# Patient Record
Sex: MALE | Race: OTHER | NOT HISPANIC OR LATINO | URBAN - METROPOLITAN AREA
[De-identification: names, ages, dates, MRNs, and addresses within clinical notes are randomized per-mention and may not be internally consistent; named-entity substitution may affect disease eponyms.]

---

## 2024-01-08 ENCOUNTER — LAB (OUTPATIENT)
Dept: LAB | Facility: LAB | Age: 34
End: 2024-01-08
Payer: COMMERCIAL

## 2024-01-08 DIAGNOSIS — E78.5 HYPERLIPIDEMIA, UNSPECIFIED: ICD-10-CM

## 2024-01-08 DIAGNOSIS — I10 ESSENTIAL (PRIMARY) HYPERTENSION: ICD-10-CM

## 2024-01-08 DIAGNOSIS — G47.33 OBSTRUCTIVE SLEEP APNEA (ADULT) (PEDIATRIC): ICD-10-CM

## 2024-01-08 DIAGNOSIS — E66.01 MORBID (SEVERE) OBESITY DUE TO EXCESS CALORIES (MULTI): ICD-10-CM

## 2024-01-08 DIAGNOSIS — E11.65 TYPE 2 DIABETES MELLITUS WITH HYPERGLYCEMIA (MULTI): Primary | ICD-10-CM

## 2024-01-08 LAB
ALBUMIN SERPL BCP-MCNC: 4.5 G/DL (ref 3.4–5)
ALP SERPL-CCNC: 22 U/L (ref 33–120)
ALT SERPL W P-5'-P-CCNC: 74 U/L (ref 10–52)
ANION GAP SERPL CALC-SCNC: 13 MMOL/L (ref 10–20)
AST SERPL W P-5'-P-CCNC: 44 U/L (ref 9–39)
BILIRUB SERPL-MCNC: 0.4 MG/DL (ref 0–1.2)
BUN SERPL-MCNC: 15 MG/DL (ref 6–23)
CALCIUM SERPL-MCNC: 9.2 MG/DL (ref 8.6–10.3)
CHLORIDE SERPL-SCNC: 101 MMOL/L (ref 98–107)
CHOLEST SERPL-MCNC: 129 MG/DL (ref 0–199)
CHOLESTEROL/HDL RATIO: 2.4
CO2 SERPL-SCNC: 28 MMOL/L (ref 21–32)
CREAT SERPL-MCNC: 0.78 MG/DL (ref 0.5–1.3)
CREAT UR-MCNC: 100 MG/DL (ref 20–370)
EGFRCR SERPLBLD CKD-EPI 2021: >90 ML/MIN/1.73M*2
ERYTHROCYTE [DISTWIDTH] IN BLOOD BY AUTOMATED COUNT: 13.5 % (ref 11.5–14.5)
EST. AVERAGE GLUCOSE BLD GHB EST-MCNC: 131 MG/DL
GLUCOSE SERPL-MCNC: 118 MG/DL (ref 74–99)
HBA1C MFR BLD: 6.2 %
HCT VFR BLD AUTO: 49.7 % (ref 41–52)
HDLC SERPL-MCNC: 52.9 MG/DL
HGB BLD-MCNC: 16.1 G/DL (ref 13.5–17.5)
LDLC SERPL CALC-MCNC: 59 MG/DL
MCH RBC QN AUTO: 29 PG (ref 26–34)
MCHC RBC AUTO-ENTMCNC: 32.4 G/DL (ref 32–36)
MCV RBC AUTO: 89 FL (ref 80–100)
MICROALBUMIN UR-MCNC: 9.7 MG/L
MICROALBUMIN/CREAT UR: 9.7 UG/MG CREAT
NON HDL CHOLESTEROL: 76 MG/DL (ref 0–149)
NRBC BLD-RTO: 0 /100 WBCS (ref 0–0)
PLATELET # BLD AUTO: 163 X10*3/UL (ref 150–450)
POTASSIUM SERPL-SCNC: 4.1 MMOL/L (ref 3.5–5.3)
PROT SERPL-MCNC: 6.8 G/DL (ref 6.4–8.2)
RBC # BLD AUTO: 5.56 X10*6/UL (ref 4.5–5.9)
SODIUM SERPL-SCNC: 138 MMOL/L (ref 136–145)
TRIGL SERPL-MCNC: 84 MG/DL (ref 0–149)
VLDL: 17 MG/DL (ref 0–40)
WBC # BLD AUTO: 6.8 X10*3/UL (ref 4.4–11.3)

## 2024-01-08 PROCEDURE — 83036 HEMOGLOBIN GLYCOSYLATED A1C: CPT

## 2024-01-08 PROCEDURE — 80061 LIPID PANEL: CPT

## 2024-01-08 PROCEDURE — 36415 COLL VENOUS BLD VENIPUNCTURE: CPT

## 2024-01-08 PROCEDURE — 82570 ASSAY OF URINE CREATININE: CPT

## 2024-01-08 PROCEDURE — 80053 COMPREHEN METABOLIC PANEL: CPT

## 2024-01-08 PROCEDURE — 85027 COMPLETE CBC AUTOMATED: CPT

## 2024-01-08 PROCEDURE — 82043 UR ALBUMIN QUANTITATIVE: CPT

## 2024-02-01 ENCOUNTER — OFFICE VISIT (OUTPATIENT)
Dept: PRIMARY CARE | Facility: CLINIC | Age: 34
End: 2024-02-01
Payer: COMMERCIAL

## 2024-02-01 VITALS
SYSTOLIC BLOOD PRESSURE: 120 MMHG | TEMPERATURE: 97.3 F | DIASTOLIC BLOOD PRESSURE: 76 MMHG | WEIGHT: 315 LBS | HEIGHT: 70 IN | RESPIRATION RATE: 16 BRPM | OXYGEN SATURATION: 94 % | BODY MASS INDEX: 45.1 KG/M2 | HEART RATE: 76 BPM

## 2024-02-01 DIAGNOSIS — R79.89 ELEVATED TSH: ICD-10-CM

## 2024-02-01 DIAGNOSIS — I10 BENIGN ESSENTIAL HYPERTENSION: ICD-10-CM

## 2024-02-01 DIAGNOSIS — E11.9 TYPE 2 DIABETES MELLITUS WITHOUT COMPLICATION, WITHOUT LONG-TERM CURRENT USE OF INSULIN (MULTI): Primary | ICD-10-CM

## 2024-02-01 PROBLEM — E78.5 HYPERLIPIDEMIA: Status: ACTIVE | Noted: 2018-01-09

## 2024-02-01 PROBLEM — Z86.16 HISTORY OF COVID-19: Status: ACTIVE | Noted: 2022-12-28

## 2024-02-01 PROBLEM — F17.200 NICOTINE USE DISORDER: Status: ACTIVE | Noted: 2022-02-07

## 2024-02-01 PROBLEM — F50.9 DISORDERED EATING: Status: ACTIVE | Noted: 2018-09-24

## 2024-02-01 PROBLEM — K76.0 NAFLD (NONALCOHOLIC FATTY LIVER DISEASE): Status: ACTIVE | Noted: 2018-03-13

## 2024-02-01 PROBLEM — R74.8 ELEVATED LIVER ENZYMES: Status: ACTIVE | Noted: 2022-06-08

## 2024-02-01 PROBLEM — J96.02 ACUTE RESPIRATORY FAILURE WITH HYPOXIA AND HYPERCAPNIA (MULTI): Status: RESOLVED | Noted: 2022-02-07 | Resolved: 2024-02-01

## 2024-02-01 PROBLEM — J96.92 HYPERCAPNIC RESPIRATORY FAILURE (MULTI): Status: ACTIVE | Noted: 2024-02-01

## 2024-02-01 PROBLEM — J96.01 ACUTE RESPIRATORY FAILURE WITH HYPOXIA AND HYPERCAPNIA (MULTI): Status: RESOLVED | Noted: 2022-02-07 | Resolved: 2024-02-01

## 2024-02-01 PROBLEM — Z99.11: Status: RESOLVED | Noted: 2022-12-28 | Resolved: 2024-02-01

## 2024-02-01 PROCEDURE — 3061F NEG MICROALBUMINURIA REV: CPT | Performed by: STUDENT IN AN ORGANIZED HEALTH CARE EDUCATION/TRAINING PROGRAM

## 2024-02-01 PROCEDURE — 3008F BODY MASS INDEX DOCD: CPT | Performed by: STUDENT IN AN ORGANIZED HEALTH CARE EDUCATION/TRAINING PROGRAM

## 2024-02-01 PROCEDURE — 99385 PREV VISIT NEW AGE 18-39: CPT | Performed by: STUDENT IN AN ORGANIZED HEALTH CARE EDUCATION/TRAINING PROGRAM

## 2024-02-01 PROCEDURE — 3044F HG A1C LEVEL LT 7.0%: CPT | Performed by: STUDENT IN AN ORGANIZED HEALTH CARE EDUCATION/TRAINING PROGRAM

## 2024-02-01 PROCEDURE — 4010F ACE/ARB THERAPY RXD/TAKEN: CPT | Performed by: STUDENT IN AN ORGANIZED HEALTH CARE EDUCATION/TRAINING PROGRAM

## 2024-02-01 PROCEDURE — 3048F LDL-C <100 MG/DL: CPT | Performed by: STUDENT IN AN ORGANIZED HEALTH CARE EDUCATION/TRAINING PROGRAM

## 2024-02-01 PROCEDURE — 3074F SYST BP LT 130 MM HG: CPT | Performed by: STUDENT IN AN ORGANIZED HEALTH CARE EDUCATION/TRAINING PROGRAM

## 2024-02-01 PROCEDURE — 3078F DIAST BP <80 MM HG: CPT | Performed by: STUDENT IN AN ORGANIZED HEALTH CARE EDUCATION/TRAINING PROGRAM

## 2024-02-01 PROCEDURE — 99214 OFFICE O/P EST MOD 30 MIN: CPT | Performed by: STUDENT IN AN ORGANIZED HEALTH CARE EDUCATION/TRAINING PROGRAM

## 2024-02-01 RX ORDER — HYDROCHLOROTHIAZIDE 25 MG/1
25 TABLET ORAL DAILY
Qty: 90 TABLET | Refills: 3 | Status: SHIPPED | OUTPATIENT
Start: 2024-02-01 | End: 2024-05-07 | Stop reason: SDUPTHER

## 2024-02-01 RX ORDER — METRONIDAZOLE 7.5 MG/G
CREAM TOPICAL
COMMUNITY

## 2024-02-01 RX ORDER — IPRATROPIUM BROMIDE AND ALBUTEROL SULFATE 2.5; .5 MG/3ML; MG/3ML
3 SOLUTION RESPIRATORY (INHALATION) 4 TIMES DAILY
COMMUNITY
Start: 2022-02-10

## 2024-02-01 RX ORDER — LIRAGLUTIDE 6 MG/ML
0.6 INJECTION SUBCUTANEOUS DAILY
COMMUNITY
Start: 2023-12-06 | End: 2024-02-01 | Stop reason: ALTCHOICE

## 2024-02-01 RX ORDER — HYDROCHLOROTHIAZIDE 25 MG/1
25 TABLET ORAL DAILY
COMMUNITY
End: 2024-02-01 | Stop reason: SDUPTHER

## 2024-02-01 RX ORDER — ALLOPURINOL 300 MG/1
1 TABLET ORAL
COMMUNITY
Start: 2023-07-17 | End: 2024-05-07 | Stop reason: SDUPTHER

## 2024-02-01 RX ORDER — TIOTROPIUM BROMIDE 18 UG/1
18 CAPSULE ORAL; RESPIRATORY (INHALATION)
COMMUNITY

## 2024-02-01 RX ORDER — LISINOPRIL 20 MG/1
20 TABLET ORAL DAILY
Qty: 90 TABLET | Refills: 3 | Status: SHIPPED | OUTPATIENT
Start: 2024-02-01 | End: 2024-04-04 | Stop reason: SDUPTHER

## 2024-02-01 RX ORDER — METFORMIN HYDROCHLORIDE 1000 MG/1
500 TABLET ORAL
COMMUNITY
End: 2024-05-07 | Stop reason: SDUPTHER

## 2024-02-01 RX ORDER — KETOCONAZOLE 20 MG/ML
SHAMPOO, SUSPENSION TOPICAL
COMMUNITY
Start: 2024-01-15

## 2024-02-01 RX ORDER — UMECLIDINIUM BROMIDE AND VILANTEROL TRIFENATATE 62.5; 25 UG/1; UG/1
1 POWDER RESPIRATORY (INHALATION)
COMMUNITY
Start: 2023-03-02

## 2024-02-01 RX ORDER — MOMETASONE FUROATE 1 MG/ML
SOLUTION TOPICAL
COMMUNITY
Start: 2024-01-15

## 2024-02-01 RX ORDER — DULAGLUTIDE 0.75 MG/.5ML
0.75 INJECTION, SOLUTION SUBCUTANEOUS
Qty: 2 ML | Refills: 3 | Status: SHIPPED | OUTPATIENT
Start: 2024-02-01 | End: 2024-05-09 | Stop reason: SDUPTHER

## 2024-02-01 RX ORDER — MICONAZOLE NITRATE 2 %
2 CREAM (GRAM) TOPICAL EVERY 2 HOUR PRN
COMMUNITY
Start: 2024-01-08

## 2024-02-01 RX ORDER — SIMVASTATIN 20 MG/1
20 TABLET, FILM COATED ORAL NIGHTLY
COMMUNITY
Start: 2022-01-07 | End: 2024-04-04 | Stop reason: SDUPTHER

## 2024-02-01 RX ORDER — ACETAMINOPHEN 500 MG
2000 TABLET ORAL
COMMUNITY
Start: 2022-03-08 | End: 2024-04-04 | Stop reason: SDUPTHER

## 2024-02-01 RX ORDER — LISINOPRIL 20 MG/1
20 TABLET ORAL DAILY
COMMUNITY
End: 2024-02-01 | Stop reason: SDUPTHER

## 2024-02-01 SDOH — HEALTH STABILITY: PHYSICAL HEALTH: ON AVERAGE, HOW MANY DAYS PER WEEK DO YOU ENGAGE IN MODERATE TO STRENUOUS EXERCISE (LIKE A BRISK WALK)?: 5 DAYS

## 2024-02-01 SDOH — ECONOMIC STABILITY: HOUSING INSECURITY
IN THE LAST 12 MONTHS, WAS THERE A TIME WHEN YOU DID NOT HAVE A STEADY PLACE TO SLEEP OR SLEPT IN A SHELTER (INCLUDING NOW)?: NO

## 2024-02-01 SDOH — ECONOMIC STABILITY: FOOD INSECURITY: WITHIN THE PAST 12 MONTHS, THE FOOD YOU BOUGHT JUST DIDN'T LAST AND YOU DIDN'T HAVE MONEY TO GET MORE.: NEVER TRUE

## 2024-02-01 SDOH — ECONOMIC STABILITY: INCOME INSECURITY: IN THE LAST 12 MONTHS, WAS THERE A TIME WHEN YOU WERE NOT ABLE TO PAY THE MORTGAGE OR RENT ON TIME?: NO

## 2024-02-01 SDOH — ECONOMIC STABILITY: FOOD INSECURITY: WITHIN THE PAST 12 MONTHS, YOU WORRIED THAT YOUR FOOD WOULD RUN OUT BEFORE YOU GOT MONEY TO BUY MORE.: NEVER TRUE

## 2024-02-01 SDOH — ECONOMIC STABILITY: TRANSPORTATION INSECURITY
IN THE PAST 12 MONTHS, HAS THE LACK OF TRANSPORTATION KEPT YOU FROM MEDICAL APPOINTMENTS OR FROM GETTING MEDICATIONS?: NO

## 2024-02-01 SDOH — ECONOMIC STABILITY: TRANSPORTATION INSECURITY
IN THE PAST 12 MONTHS, HAS LACK OF TRANSPORTATION KEPT YOU FROM MEETINGS, WORK, OR FROM GETTING THINGS NEEDED FOR DAILY LIVING?: NO

## 2024-02-01 SDOH — HEALTH STABILITY: PHYSICAL HEALTH: ON AVERAGE, HOW MANY MINUTES DO YOU ENGAGE IN EXERCISE AT THIS LEVEL?: 90 MIN

## 2024-02-01 SDOH — ECONOMIC STABILITY: GENERAL
WHICH OF THE FOLLOWING WOULD YOU LIKE TO GET CONNECTED TO IN ORDER TO RECEIVE A DISCOUNT OR FOR FREE? (CHOOSE ALL THAT APPLY): NONE OF THESE

## 2024-02-01 SDOH — ECONOMIC STABILITY: GENERAL
WHICH OF THE FOLLOWING DO YOU KNOW HOW TO USE AND HAVE ACCESS TO EVERY DAY? (CHOOSE ALL THAT APPLY): DESKTOP COMPUTER, LAPTOP COMPUTER, OR TABLET WITH BROADBAND INTERNET CONNECTION;SMARTPHONE WITH CELLULAR DATA PLAN

## 2024-02-01 ASSESSMENT — SOCIAL DETERMINANTS OF HEALTH (SDOH)
HOW OFTEN DO YOU ATTENT MEETINGS OF THE CLUB OR ORGANIZATION YOU BELONG TO?: NEVER
DO YOU BELONG TO ANY CLUBS OR ORGANIZATIONS SUCH AS CHURCH GROUPS UNIONS, FRATERNAL OR ATHLETIC GROUPS, OR SCHOOL GROUPS?: NO
WITHIN THE LAST YEAR, HAVE YOU BEEN HUMILIATED OR EMOTIONALLY ABUSED IN OTHER WAYS BY YOUR PARTNER OR EX-PARTNER?: NO
WITHIN THE LAST YEAR, HAVE TO BEEN RAPED OR FORCED TO HAVE ANY KIND OF SEXUAL ACTIVITY BY YOUR PARTNER OR EX-PARTNER?: NO
WITHIN THE LAST YEAR, HAVE YOU BEEN KICKED, HIT, SLAPPED, OR OTHERWISE PHYSICALLY HURT BY YOUR PARTNER OR EX-PARTNER?: NO
HOW HARD IS IT FOR YOU TO PAY FOR THE VERY BASICS LIKE FOOD, HOUSING, MEDICAL CARE, AND HEATING?: NOT HARD AT ALL
HOW OFTEN DO YOU ATTEND CHURCH OR RELIGIOUS SERVICES?: NEVER
HOW OFTEN DO YOU GET TOGETHER WITH FRIENDS OR RELATIVES?: MORE THAN THREE TIMES A WEEK
IN THE PAST 12 MONTHS, HAS THE ELECTRIC, GAS, OIL, OR WATER COMPANY THREATENED TO SHUT OFF SERVICE IN YOUR HOME?: NO
WITHIN THE LAST YEAR, HAVE YOU BEEN AFRAID OF YOUR PARTNER OR EX-PARTNER?: NO
IN A TYPICAL WEEK, HOW MANY TIMES DO YOU TALK ON THE PHONE WITH FAMILY, FRIENDS, OR NEIGHBORS?: MORE THAN THREE TIMES A WEEK

## 2024-02-01 ASSESSMENT — LIFESTYLE VARIABLES
HOW MANY STANDARD DRINKS CONTAINING ALCOHOL DO YOU HAVE ON A TYPICAL DAY: PATIENT DOES NOT DRINK
HOW OFTEN DO YOU HAVE A DRINK CONTAINING ALCOHOL: NEVER

## 2024-02-01 ASSESSMENT — ENCOUNTER SYMPTOMS
VOMITING: 0
BLOOD IN STOOL: 0
SHORTNESS OF BREATH: 0
FEVER: 0
MYALGIAS: 0
RHINORRHEA: 0
SINUS PAIN: 0
ARTHRALGIAS: 0
HEMATURIA: 0
NAUSEA: 0
FATIGUE: 0
CONSTIPATION: 0
LIGHT-HEADEDNESS: 0
LOSS OF SENSATION IN FEET: 0
OCCASIONAL FEELINGS OF UNSTEADINESS: 0
DEPRESSION: 0
APPETITE CHANGE: 0
PALPITATIONS: 0
DIZZINESS: 0
DIARRHEA: 0
FLANK PAIN: 0
ABDOMINAL PAIN: 0

## 2024-02-01 ASSESSMENT — PATIENT HEALTH QUESTIONNAIRE - PHQ9
SUM OF ALL RESPONSES TO PHQ9 QUESTIONS 1 & 2: 0
2. FEELING DOWN, DEPRESSED OR HOPELESS: NOT AT ALL
1. LITTLE INTEREST OR PLEASURE IN DOING THINGS: NOT AT ALL

## 2024-02-01 NOTE — PROGRESS NOTES
Subjective   Fadi Najjar is a 33 y.o. male who is here for a routine exam.  Active Problem List      Comprehensive Medical/Surgical/Social/Family History  Past Medical History:   Diagnosis Date    Acute respiratory failure with hypoxia and hypercapnia (CMS/HCC) 02/07/2022    Personal history of other diseases of the circulatory system 01/30/2018    History of cardiomegaly     Past Surgical History:   Procedure Laterality Date    TONSILLECTOMY  01/30/2018    Tonsillectomy     Social History     Social History Narrative    Not on file         Allergies and Medications  Bee venom protein (honey bee) and Penicillins  Current Outpatient Medications on File Prior to Visit   Medication Sig Dispense Refill    allopurinol (Zyloprim) 300 mg tablet Take 1 tablet (300 mg) by mouth once daily.      Anoro Ellipta 62.5-25 mcg/actuation blister with device Inhale 1 puff once daily.      aspirin-calcium carbonate 81 mg-300 mg calcium(777 mg) tablet Take 1 tablet by mouth once daily.      cholecalciferol (Vitamin D-3) 50 mcg (2,000 unit) capsule Take 1 capsule (50 mcg) by mouth once daily.      ipratropium-albuteroL (Duo-Neb) 0.5-2.5 mg/3 mL nebulizer solution Inhale 3 mL 4 times a day.      ketoconazole (NIZOral) 2 % shampoo LATHER ON SCALP 3TIMES WEEKLY IN SHOWER. LEAVE ON 3-5 MINUTES BEFORE RINSING.      metFORMIN (Glucophage) 1,000 mg tablet Take 0.5 tablets (500 mg) by mouth 2 times a day after meals.      metroNIDAZOLE (Metrocream) 0.75 % cream APPLY TO FACE ONCE TO TWICE A DAY.      mometasone (Elocon) 0.1 % lotion APPLY TO AFFECTED AREAS ON FACE TWICE DAILY MONDAY THROUGH FRIDAY OFF ON WEEKENDS REPEAT AS NEEDED      nicotine polacrilex (Nicorette) 2 mg gum Take 1 each (2 mg) by mouth every 2 hours if needed.      simvastatin (Zocor) 20 mg tablet Take 1 tablet (20 mg) by mouth once daily at bedtime.      tiotropium (Spiriva) 18 mcg inhalation capsule Place 1 capsule (18 mcg) into inhaler and inhale.      [DISCONTINUED]  hydroCHLOROthiazide (HYDRODiuril) 25 mg tablet Take 1 tablet (25 mg) by mouth once daily.      [DISCONTINUED] lisinopril 20 mg tablet Take 1 tablet (20 mg) by mouth once daily.      [DISCONTINUED] Victoza 2-Bryan 0.6 mg/0.1 mL (18 mg/3 mL) injection Inject 0.1 mL (0.6 mg) under the skin once daily.       No current facility-administered medications on file prior to visit.       Concerns today:    Diabetic Review of Systems - medication compliance: compliant all of the time, diabetic diet compliance: noncompliant some of the time, home glucose monitoring: is not performed.  none    Has tried metformin, Victoza in the past is having some difficulty with side effects of Actos is interested in trialing new medication.  Has not tried SGLT2.  Is interested in this today also interested in Ozempic versus Mounjaro as well.        Lifestyle  Diet:  Healthy, Well Balanced, and Home-cooked  Physical Activity: Sufficiently Active (2/1/2024)    Exercise Vital Sign     Days of Exercise per Week: 5 days     Minutes of Exercise per Session: 90 min      Tobacco Use: High Risk (2/1/2024)    Patient History     Smoking Tobacco Use: Some Days     Smokeless Tobacco Use: Current     Passive Exposure: Not on file      Alcohol Use: Not At Risk (2/1/2024)    AUDIT-C     Frequency of Alcohol Consumption: Never     Average Number of Drinks: Patient does not drink     Frequency of Binge Drinking: Not on file      Depression: Not at risk (2/1/2024)    PHQ-2     PHQ-2 Score: 0      Stress: No Stress Concern Present (2/1/2024)    [employer] - Occupational Stress Questionnaire     Feeling of Stress : Not at all       Consultants:  Denise Quintero ESTELITA -  severe  [place] - Cardio      Review of Systems   Constitutional:  Negative for appetite change, fatigue and fever.   HENT:  Negative for ear discharge, ear pain, hearing loss, postnasal drip, rhinorrhea and sinus pain.    Eyes:  Negative for visual disturbance.  "  Respiratory:  Negative for shortness of breath.    Cardiovascular:  Negative for chest pain, palpitations and leg swelling.   Gastrointestinal:  Negative for abdominal pain, blood in stool, constipation, diarrhea, nausea and vomiting.   Genitourinary:  Negative for flank pain and hematuria.   Musculoskeletal:  Negative for arthralgias and myalgias.   Skin:  Negative for rash.   Neurological:  Negative for dizziness and light-headedness.   All other systems reviewed and are negative.      Objective   /76 (BP Location: Right arm, Patient Position: Sitting)   Pulse 76   Temp 36.3 °C (97.3 °F) (Temporal)   Resp 16   Ht 1.778 m (5' 10\")   Wt (!) 161 kg (355 lb 4 oz)   SpO2 94%   BMI 50.97 kg/m²     Physical Exam  Vitals reviewed.   Constitutional:       General: He is not in acute distress.     Appearance: Normal appearance. He is normal weight. He is not toxic-appearing.   HENT:      Head: Normocephalic and atraumatic.      Right Ear: Tympanic membrane and ear canal normal.      Left Ear: Tympanic membrane and ear canal normal.      Nose: Nose normal. No congestion or rhinorrhea.      Mouth/Throat:      Mouth: Mucous membranes are dry.   Eyes:      General: No scleral icterus.     Extraocular Movements: Extraocular movements intact.      Conjunctiva/sclera: Conjunctivae normal.      Pupils: Pupils are equal, round, and reactive to light.   Cardiovascular:      Rate and Rhythm: Normal rate and regular rhythm.      Heart sounds: No murmur heard.     No friction rub. No gallop.   Pulmonary:      Effort: Pulmonary effort is normal. No respiratory distress.      Breath sounds: Normal breath sounds. No wheezing, rhonchi or rales.   Abdominal:      General: Abdomen is flat. There is no distension.      Palpations: Abdomen is soft.      Tenderness: There is no abdominal tenderness. There is no guarding.   Musculoskeletal:         General: Normal range of motion.      Cervical back: Normal range of motion and neck " supple.      Right lower leg: No edema.      Left lower leg: No edema.   Lymphadenopathy:      Cervical: No cervical adenopathy.   Skin:     General: Skin is warm and dry.   Neurological:      General: No focal deficit present.      Mental Status: He is alert and oriented to person, place, and time.   Psychiatric:         Mood and Affect: Mood normal.         Behavior: Behavior normal.         Assessment/Plan   Problem List Items Addressed This Visit       Benign essential hypertension    Relevant Medications    lisinopril 20 mg tablet    hydroCHLOROthiazide (HYDRODiuril) 25 mg tablet    Elevated TSH    Relevant Orders    TSH with reflex to Free T4 if abnormal    Type 2 diabetes mellitus without complication (CMS/HCC) - Primary    Relevant Medications    empagliflozin (Jardiance) 10 mg    dulaglutide (Trulicity) 0.75 mg/0.5 mL pen injector    Other Relevant Orders    Follow Up In [facility] - PCP - Established    Vitamin B12       Reviewed Social Determinants of health with patient, discussed healthy lifestyle including 150 minutes of physical activity per week  Ordered/Reviewed baseline labwork -CBC, CMP, Lipid Panel  Immunizations Up-to-Date  Follow Up:  In 3 months or sooner if new concerns arise    Lab Results   Component Value Date    HGBA1C 6.2 (H) 01/08/2024     Continue treatment including: Metformin, GLP-1/terzepatide, and SGLT-2  Complications include: none  Continue to monitor blood sugars  Continue with follow ups including:   Ophthalmology: Up-to-date  Podiatry: Not Indicated  Cardiology: Up-to-date  Discussed healthy, low carb diet and trying to get 150 minutes of physical activity per week.  Follow up in 3 months or sooner if new concerns arise     This patient is having side effects of medications we will consider alternatives.  We will trial Trulicity and Jardiance currently.    Follow-up as new concerns arise.  Continue follow-up with specialist.

## 2024-03-25 DIAGNOSIS — M10.9 ACUTE GOUT OF RIGHT FOOT, UNSPECIFIED CAUSE: Primary | ICD-10-CM

## 2024-03-25 RX ORDER — COLCHICINE 0.6 MG/1
0.6 TABLET ORAL 2 TIMES DAILY
Qty: 60 TABLET | Refills: 0 | Status: SHIPPED | OUTPATIENT
Start: 2024-03-25 | End: 2024-03-27

## 2024-03-26 DIAGNOSIS — M10.9 ACUTE GOUT OF RIGHT FOOT, UNSPECIFIED CAUSE: ICD-10-CM

## 2024-03-27 RX ORDER — COLCHICINE 0.6 MG/1
0.6 TABLET ORAL 2 TIMES DAILY
Qty: 60 TABLET | Refills: 0 | Status: SHIPPED | OUTPATIENT
Start: 2024-03-27 | End: 2024-04-18

## 2024-03-27 ASSESSMENT — ENCOUNTER SYMPTOMS
HEMOPTYSIS: 0
RHINORRHEA: 1
WHEEZING: 0
WEIGHT LOSS: 0
CHILLS: 0
MYALGIAS: 0
FEVER: 0
HEADACHES: 1
SHORTNESS OF BREATH: 0
HEARTBURN: 0
SWEATS: 0
COUGH: 1
SORE THROAT: 0

## 2024-03-28 ENCOUNTER — TELEMEDICINE (OUTPATIENT)
Dept: PRIMARY CARE | Facility: CLINIC | Age: 34
End: 2024-03-28
Payer: COMMERCIAL

## 2024-03-28 DIAGNOSIS — J06.9 UPPER RESPIRATORY TRACT INFECTION, UNSPECIFIED TYPE: Primary | ICD-10-CM

## 2024-03-28 DIAGNOSIS — E11.9 TYPE 2 DIABETES MELLITUS WITHOUT COMPLICATION, WITHOUT LONG-TERM CURRENT USE OF INSULIN (MULTI): ICD-10-CM

## 2024-03-28 PROBLEM — J96.92 HYPERCAPNIC RESPIRATORY FAILURE (MULTI): Status: RESOLVED | Noted: 2024-02-01 | Resolved: 2024-03-28

## 2024-03-28 PROCEDURE — 3048F LDL-C <100 MG/DL: CPT | Performed by: STUDENT IN AN ORGANIZED HEALTH CARE EDUCATION/TRAINING PROGRAM

## 2024-03-28 PROCEDURE — 3008F BODY MASS INDEX DOCD: CPT | Performed by: STUDENT IN AN ORGANIZED HEALTH CARE EDUCATION/TRAINING PROGRAM

## 2024-03-28 PROCEDURE — 99213 OFFICE O/P EST LOW 20 MIN: CPT | Performed by: STUDENT IN AN ORGANIZED HEALTH CARE EDUCATION/TRAINING PROGRAM

## 2024-03-28 PROCEDURE — 3044F HG A1C LEVEL LT 7.0%: CPT | Performed by: STUDENT IN AN ORGANIZED HEALTH CARE EDUCATION/TRAINING PROGRAM

## 2024-03-28 PROCEDURE — 3061F NEG MICROALBUMINURIA REV: CPT | Performed by: STUDENT IN AN ORGANIZED HEALTH CARE EDUCATION/TRAINING PROGRAM

## 2024-03-28 PROCEDURE — 4010F ACE/ARB THERAPY RXD/TAKEN: CPT | Performed by: STUDENT IN AN ORGANIZED HEALTH CARE EDUCATION/TRAINING PROGRAM

## 2024-03-28 RX ORDER — FLUTICASONE PROPIONATE 50 MCG
1 SPRAY, SUSPENSION (ML) NASAL DAILY
Qty: 16 G | Refills: 0 | Status: SHIPPED | OUTPATIENT
Start: 2024-03-28 | End: 2024-04-19

## 2024-03-28 RX ORDER — CETIRIZINE HYDROCHLORIDE 10 MG/1
10 TABLET ORAL DAILY
Qty: 30 TABLET | Refills: 0 | Status: SHIPPED | OUTPATIENT
Start: 2024-03-28 | End: 2024-04-19

## 2024-03-28 RX ORDER — IPRATROPIUM BROMIDE 42 UG/1
2 SPRAY, METERED NASAL 4 TIMES DAILY PRN
Qty: 15 ML | Refills: 0 | Status: SHIPPED | OUTPATIENT
Start: 2024-03-28 | End: 2024-04-01

## 2024-03-28 ASSESSMENT — ENCOUNTER SYMPTOMS
WHEEZING: 0
SINUS PAIN: 0
HEADACHES: 1
MYALGIAS: 0
ADENOPATHY: 0
RHINORRHEA: 1
WEAKNESS: 0
SORE THROAT: 0
NUMBNESS: 0
SHORTNESS OF BREATH: 0
FEVER: 0
LIGHT-HEADEDNESS: 0
NECK STIFFNESS: 0
COUGH: 1
CHILLS: 0
DIZZINESS: 0
ABDOMINAL PAIN: 0

## 2024-03-28 NOTE — PROGRESS NOTES
Subjective   Patient ID: Fadi Najjar is a 33 y.o. male who presents for Influenza (Pt to do virtual visit today for possible flu).  Donald is presenting today for virtual visit for URI symptoms over the last 3 days.  States 3 days ago he developed cough, congestion, general malaise.  Denies any shortness of breath, wheezing, or fever/chills.  He has taken multiple COVID-19 tests which were negative.  Denies any known COVID-19 or influenza exposures.  States he has been taking OTC medicine including DayQuil/NyQuil which have helped improve his symptoms.  States he feels overall he has been improving since symptom onset.  Denies any other concerns at this time.  Answers submitted by the patient for this visit:  Cough Questionnaire (Submitted on 3/27/2024)  Chief Complaint: Cough  Chronicity: new  Onset: yesterday  Progression since onset: unchanged  Frequency: every few minutes  Cough characteristics: productive of sputum  ear congestion: No  heartburn: No  hemoptysis: No  nasal congestion: Yes  sweats: No  weight loss: No  Aggravated by: nothing    Review of Systems   Constitutional:  Negative for chills and fever.   HENT:  Positive for postnasal drip and rhinorrhea. Negative for ear pain, sinus pain and sore throat.    Eyes:  Negative for visual disturbance.   Respiratory:  Positive for cough. Negative for shortness of breath and wheezing.    Cardiovascular:  Negative for chest pain.   Gastrointestinal:  Negative for abdominal pain.   Musculoskeletal:  Negative for myalgias and neck stiffness.   Skin:  Negative for rash.   Neurological:  Positive for headaches. Negative for dizziness, weakness, light-headedness and numbness.   Hematological:  Negative for adenopathy.   All other systems reviewed and are negative.    Objective   There were no vitals taken for this visit.    Physical Exam  Constitutional:       General: He is not in acute distress.     Appearance: Normal appearance. He is not toxic-appearing.       Comments: Limited due to virtual appointment.   HENT:      Head: Normocephalic and atraumatic.      Right Ear: External ear normal.      Left Ear: External ear normal.      Nose: Congestion present.   Eyes:      Extraocular Movements: Extraocular movements intact.      Conjunctiva/sclera: Conjunctivae normal.   Pulmonary:      Effort: No respiratory distress.   Musculoskeletal:      Cervical back: Normal range of motion.   Neurological:      Mental Status: He is alert and oriented to person, place, and time.   Psychiatric:         Mood and Affect: Mood normal.         Behavior: Behavior normal.         Thought Content: Thought content normal.         Judgment: Judgment normal.       Assessment/Plan   Problem List Items Addressed This Visit             ICD-10-CM    Type 2 diabetes mellitus without complication (CMS/[facility]) E11.9     Other Visit Diagnoses         Codes    Upper respiratory tract infection, unspecified type    -  Primary J06.9    Relevant Medications    fluticasone (Flonase) 50 mcg/actuation nasal spray    ipratropium (Atrovent) 42 mcg (0.06 %) nasal spray    cetirizine (ZyrTEC) 10 mg tablet        URI symptoms x 3 days, suspect viral URI given patient has already noticed improvement with OTC medications for symptom management.  Will prescribe Flonase nasal spray to be used once daily in addition to Atrovent nasal spray to be used as needed for congestion/cough.  Will also start on antihistamine cetirizine once daily until symptoms have improved/resolved.  Advised patient go to the emergency room if developing new wheezing, shortness of breath, chest pain, or any other new or concerning symptoms.  Plan for follow-up within the next 5 to 7 days if no improvement of symptoms or any worsening.  If symptoms continue to improve, he may return for his regularly scheduled 3-month follow-up on 5/3/2024.

## 2024-04-04 ENCOUNTER — TELEPHONE (OUTPATIENT)
Dept: PRIMARY CARE | Facility: CLINIC | Age: 34
End: 2024-04-04
Payer: COMMERCIAL

## 2024-04-04 DIAGNOSIS — E11.9 TYPE 2 DIABETES MELLITUS WITHOUT COMPLICATION, WITHOUT LONG-TERM CURRENT USE OF INSULIN (MULTI): ICD-10-CM

## 2024-04-04 DIAGNOSIS — E55.9 VITAMIN D DEFICIENCY: Primary | ICD-10-CM

## 2024-04-04 DIAGNOSIS — I10 BENIGN ESSENTIAL HYPERTENSION: ICD-10-CM

## 2024-04-04 RX ORDER — ACETAMINOPHEN 500 MG
2000 TABLET ORAL
Qty: 90 CAPSULE | Refills: 3 | Status: SHIPPED | OUTPATIENT
Start: 2024-04-04 | End: 2024-05-07 | Stop reason: SDUPTHER

## 2024-04-04 RX ORDER — LISINOPRIL 20 MG/1
20 TABLET ORAL DAILY
Qty: 90 TABLET | Refills: 3 | Status: SHIPPED | OUTPATIENT
Start: 2024-04-04 | End: 2024-05-07 | Stop reason: SDUPTHER

## 2024-04-04 RX ORDER — SIMVASTATIN 20 MG/1
20 TABLET, FILM COATED ORAL NIGHTLY
Qty: 90 TABLET | Refills: 3 | Status: SHIPPED | OUTPATIENT
Start: 2024-04-04 | End: 2024-05-07 | Stop reason: SDUPTHER

## 2024-04-18 DIAGNOSIS — M10.9 ACUTE GOUT OF RIGHT FOOT, UNSPECIFIED CAUSE: ICD-10-CM

## 2024-04-18 RX ORDER — COLCHICINE 0.6 MG/1
0.6 TABLET ORAL 2 TIMES DAILY
Qty: 90 TABLET | Refills: 1 | Status: SHIPPED | OUTPATIENT
Start: 2024-04-18 | End: 2024-05-07 | Stop reason: SDUPTHER

## 2024-04-19 DIAGNOSIS — J06.9 UPPER RESPIRATORY TRACT INFECTION, UNSPECIFIED TYPE: ICD-10-CM

## 2024-04-19 RX ORDER — FLUTICASONE PROPIONATE 50 MCG
SPRAY, SUSPENSION (ML) NASAL
Qty: 48 ML | Refills: 1 | Status: SHIPPED | OUTPATIENT
Start: 2024-04-19

## 2024-04-19 RX ORDER — CETIRIZINE HYDROCHLORIDE 10 MG/1
10 TABLET ORAL DAILY
Qty: 90 TABLET | Refills: 1 | Status: SHIPPED | OUTPATIENT
Start: 2024-04-19

## 2024-05-03 ENCOUNTER — APPOINTMENT (OUTPATIENT)
Dept: PRIMARY CARE | Facility: CLINIC | Age: 34
End: 2024-05-03
Payer: COMMERCIAL

## 2024-05-07 ENCOUNTER — OFFICE VISIT (OUTPATIENT)
Dept: PRIMARY CARE | Facility: CLINIC | Age: 34
End: 2024-05-07
Payer: COMMERCIAL

## 2024-05-07 VITALS
TEMPERATURE: 98 F | SYSTOLIC BLOOD PRESSURE: 113 MMHG | DIASTOLIC BLOOD PRESSURE: 72 MMHG | HEART RATE: 89 BPM | RESPIRATION RATE: 16 BRPM | BODY MASS INDEX: 49.93 KG/M2 | WEIGHT: 315 LBS | OXYGEN SATURATION: 92 %

## 2024-05-07 DIAGNOSIS — E11.9 TYPE 2 DIABETES MELLITUS WITHOUT COMPLICATION, WITHOUT LONG-TERM CURRENT USE OF INSULIN (MULTI): ICD-10-CM

## 2024-05-07 DIAGNOSIS — I10 BENIGN ESSENTIAL HYPERTENSION: ICD-10-CM

## 2024-05-07 DIAGNOSIS — E55.9 VITAMIN D DEFICIENCY: ICD-10-CM

## 2024-05-07 DIAGNOSIS — M10.9 ACUTE GOUT OF RIGHT FOOT, UNSPECIFIED CAUSE: ICD-10-CM

## 2024-05-07 LAB — POC HEMOGLOBIN A1C: 5.8 % (ref 4.2–6.5)

## 2024-05-07 PROCEDURE — 3078F DIAST BP <80 MM HG: CPT

## 2024-05-07 PROCEDURE — 4010F ACE/ARB THERAPY RXD/TAKEN: CPT

## 2024-05-07 PROCEDURE — 3008F BODY MASS INDEX DOCD: CPT

## 2024-05-07 PROCEDURE — 3074F SYST BP LT 130 MM HG: CPT

## 2024-05-07 PROCEDURE — 3044F HG A1C LEVEL LT 7.0%: CPT

## 2024-05-07 PROCEDURE — 99214 OFFICE O/P EST MOD 30 MIN: CPT

## 2024-05-07 PROCEDURE — 83036 HEMOGLOBIN GLYCOSYLATED A1C: CPT

## 2024-05-07 PROCEDURE — 3048F LDL-C <100 MG/DL: CPT

## 2024-05-07 PROCEDURE — 3061F NEG MICROALBUMINURIA REV: CPT

## 2024-05-07 RX ORDER — ALLOPURINOL 300 MG/1
300 TABLET ORAL
Qty: 30 TABLET | Refills: 2 | Status: SHIPPED | OUTPATIENT
Start: 2024-05-07 | End: 2024-08-05

## 2024-05-07 RX ORDER — TIRZEPATIDE 2.5 MG/.5ML
2.5 INJECTION, SOLUTION SUBCUTANEOUS
Qty: 2 ML | Refills: 2 | Status: SHIPPED | OUTPATIENT
Start: 2024-05-07 | End: 2024-05-09 | Stop reason: WASHOUT

## 2024-05-07 RX ORDER — LISINOPRIL 20 MG/1
20 TABLET ORAL DAILY
Qty: 90 TABLET | Refills: 3 | Status: SHIPPED | OUTPATIENT
Start: 2024-05-07 | End: 2025-05-07

## 2024-05-07 RX ORDER — HYDROCHLOROTHIAZIDE 25 MG/1
25 TABLET ORAL DAILY
Qty: 90 TABLET | Refills: 3 | Status: SHIPPED | OUTPATIENT
Start: 2024-05-07 | End: 2025-05-07

## 2024-05-07 RX ORDER — ACETAMINOPHEN 500 MG
2000 TABLET ORAL
Qty: 90 CAPSULE | Refills: 3 | Status: SHIPPED | OUTPATIENT
Start: 2024-05-07 | End: 2025-05-07

## 2024-05-07 RX ORDER — SIMVASTATIN 20 MG/1
20 TABLET, FILM COATED ORAL NIGHTLY
Qty: 90 TABLET | Refills: 3 | Status: SHIPPED | OUTPATIENT
Start: 2024-05-07 | End: 2025-05-07

## 2024-05-07 RX ORDER — METFORMIN HYDROCHLORIDE 1000 MG/1
500 TABLET ORAL
Qty: 30 TABLET | Refills: 2 | Status: SHIPPED | OUTPATIENT
Start: 2024-05-07 | End: 2024-08-05

## 2024-05-07 RX ORDER — COLCHICINE 0.6 MG/1
0.6 TABLET ORAL 2 TIMES DAILY
Qty: 90 TABLET | Refills: 1 | Status: SHIPPED | OUTPATIENT
Start: 2024-05-07 | End: 2024-08-05

## 2024-05-07 ASSESSMENT — ENCOUNTER SYMPTOMS
SHORTNESS OF BREATH: 0
DIZZINESS: 0
CONSTIPATION: 0
NAUSEA: 1
DIARRHEA: 0
VOMITING: 0
LIGHT-HEADEDNESS: 0
ABDOMINAL PAIN: 0

## 2024-05-07 NOTE — ASSESSMENT & PLAN NOTE
In office hemoglobin A1c 5.8 which is improved from prior.  This patient had nausea from Trulicity and subsequently stopped taking his medication early we will transition him to Mounjaro 2.5 weekly injection.  He has previously trialed Victoza and is currently on Jardiance and metformin.    Will continue Jardiance 10 mg daily and metformin 500 mg twice daily.    Follow-up in 3 months or diabetes checkup or earlier if needed.

## 2024-05-07 NOTE — PROGRESS NOTES
Subjective   HPI  Fadi Najjar is a 33 y.o. male who is here for 3 month diabetes follow up. At last appointment was started on Trulicity 0.75 weekly - had nausea.  He reports that initially he was able to drop to a weight of 325 pounds but regained some weight during Ramadan and today he is weighed at 348 pounds in the office.  He is currently taking Jardiance 10 mg daily and metformin 500 mg twice daily.    He also needs refills on his other maintenance medications.    Review of Systems   Respiratory:  Negative for shortness of breath.    Cardiovascular:  Negative for chest pain.   Gastrointestinal:  Positive for nausea. Negative for abdominal pain, constipation, diarrhea and vomiting.   Neurological:  Negative for dizziness and light-headedness.   All other systems reviewed and are negative.      Objective   /72 (BP Location: Right arm, Patient Position: Sitting)   Pulse 89   Temp 36.7 °C (98 °F) (Temporal)   Resp 16   Wt (!) 158 kg (348 lb)   SpO2 92%   BMI 49.93 kg/m²     Physical Exam  Vitals reviewed.   Constitutional:       General: He is not in acute distress.     Appearance: Normal appearance. He is not toxic-appearing.   HENT:      Head: Normocephalic and atraumatic.   Eyes:      Extraocular Movements: Extraocular movements intact.   Cardiovascular:      Rate and Rhythm: Normal rate.   Pulmonary:      Effort: Pulmonary effort is normal.   Skin:     General: Skin is warm and dry.   Neurological:      Mental Status: He is alert and oriented to person, place, and time.   Psychiatric:         Mood and Affect: Mood normal.         Behavior: Behavior normal.         Assessment/Plan   Problem List Items Addressed This Visit       Benign essential hypertension    Relevant Medications    hydroCHLOROthiazide (HYDRODiuril) 25 mg tablet    lisinopril 20 mg tablet    Type 2 diabetes mellitus without complication (Multi)     In office hemoglobin A1c 5.8 which is improved from prior.  This patient had nausea  "from Trulicity and subsequently stopped taking his medication early we will transition him to Mounjaro 2.5 weekly injection.  He has previously trialed Victoza and is currently on Jardiance and metformin.    Will continue Jardiance 10 mg daily and metformin 500 mg twice daily.    Follow-up in 3 months or diabetes checkup or earlier if needed.         Relevant Medications    empagliflozin (Jardiance) 10 mg    metFORMIN (Glucophage) 1,000 mg tablet    simvastatin (Zocor) 20 mg tablet    tirzepatide (Mounjaro) 2.5 mg/0.5 mL pen injector    Other Relevant Orders    POCT glycosylated hemoglobin (Hb A1C) manually resulted (Completed)    Vitamin D deficiency    Relevant Medications    cholecalciferol (Vitamin D-3) 50 mcg (2,000 unit) capsule     Other Visit Diagnoses       Acute gout of right foot, unspecified cause        Relevant Medications    allopurinol (Zyloprim) 300 mg tablet    colchicine 0.6 mg tablet            This note was partially created using voice recognition software and is inherently subject to errors including those of syntax and \"sound-alike\" substitutions which may escape proofreading. In such instances, original meaning may be extrapolated by contextual derivation.  "

## 2024-05-08 NOTE — PROGRESS NOTES
I saw and evaluated the patient. I personally obtained the key and critical portions of the history and physical exam or was physically present for key and critical portions performed by the resident/fellow. I reviewed the resident/fellow's documentation and discussed the patient with the resident/fellow. I agree with the resident/fellow's medical decision making as documented in the note.    Gerry Velez, DO

## 2024-05-09 DIAGNOSIS — E11.9 TYPE 2 DIABETES MELLITUS WITHOUT COMPLICATION, WITHOUT LONG-TERM CURRENT USE OF INSULIN (MULTI): ICD-10-CM

## 2024-05-09 RX ORDER — DULAGLUTIDE 0.75 MG/.5ML
0.75 INJECTION, SOLUTION SUBCUTANEOUS
Qty: 2 ML | Refills: 3 | Status: SHIPPED | OUTPATIENT
Start: 2024-05-12

## 2024-06-11 ENCOUNTER — TELEMEDICINE (OUTPATIENT)
Dept: PRIMARY CARE | Facility: CLINIC | Age: 34
End: 2024-06-11
Payer: COMMERCIAL

## 2024-06-11 VITALS
TEMPERATURE: 98.1 F | WEIGHT: 315 LBS | BODY MASS INDEX: 48.07 KG/M2 | SYSTOLIC BLOOD PRESSURE: 125 MMHG | DIASTOLIC BLOOD PRESSURE: 89 MMHG

## 2024-06-11 DIAGNOSIS — M10.9 ACUTE GOUT OF RIGHT FOOT, UNSPECIFIED CAUSE: ICD-10-CM

## 2024-06-11 DIAGNOSIS — G44.219 EPISODIC TENSION-TYPE HEADACHE, NOT INTRACTABLE: Primary | ICD-10-CM

## 2024-06-11 PROCEDURE — 3048F LDL-C <100 MG/DL: CPT

## 2024-06-11 PROCEDURE — 3044F HG A1C LEVEL LT 7.0%: CPT

## 2024-06-11 PROCEDURE — 3079F DIAST BP 80-89 MM HG: CPT

## 2024-06-11 PROCEDURE — 3074F SYST BP LT 130 MM HG: CPT

## 2024-06-11 PROCEDURE — 3061F NEG MICROALBUMINURIA REV: CPT

## 2024-06-11 PROCEDURE — 4010F ACE/ARB THERAPY RXD/TAKEN: CPT

## 2024-06-11 PROCEDURE — 3008F BODY MASS INDEX DOCD: CPT

## 2024-06-11 PROCEDURE — 99214 OFFICE O/P EST MOD 30 MIN: CPT

## 2024-06-11 ASSESSMENT — ENCOUNTER SYMPTOMS
NAUSEA: 0
SINUS PRESSURE: 0
ABDOMINAL PAIN: 0
VOMITING: 0
NECK PAIN: 0
HEADACHES: 1
NECK STIFFNESS: 0

## 2024-06-11 NOTE — PROGRESS NOTES
Subjective   Fadi Najjar is a 33 y.o. male who presents for Migraine (Pt to do virtual visit today for migraines. Pt stated that he has had 2-3 months.) and Gout (Pt also mentioned that he is having a gout flare up as well.).    HPI    Donald is here for the above.    He reports he is having headaches every other week. HA started out as occasional, now more frequent, once a week. He did go to the ED for persistent headache, had head scanned and was negative. Pain in front, back and sides of head. Pain does not radiate.    He is having blurry vision, light sensitivity with headaches.  Has not needing to miss work due to headaches.  HA last throughout the whole day.  He sometimes wakes up with headaches, but wearing CPAP for ESTELITA.  Report no nasal congestion.  He has tried tylenol and Excedrin for headaches intermittent/ temporary relief.  He is drinking 5-6 bottles of water a day.  No known sick contact, denies trouble moving neck, no neck pain.  He manages a gas station, does work long hours.  Reports he is not around fumes. He does have a working CO detector in home.  Headaches are worse during weeks when he works long hours.  Denies abdominal pain, nausea, vomiting.     Also believes he is having a gout flare in his right foot. This has been going on for 1 day. Having pain and now pain is worse.  Trouble sleeping last night. He tried elevation without relief. Using allopurinol and tylenol for flare.    Reports he is not eating red meat, no ETOH like beer. He eats eggs in the morning.  This is his third gout flare.     He is smoking a cigarettes every other day.    Review of Systems   HENT:  Negative for congestion and sinus pressure.    Eyes:  Positive for visual disturbance.   Gastrointestinal:  Negative for abdominal pain, nausea and vomiting.   Musculoskeletal:  Negative for neck pain and neck stiffness.   Neurological:  Positive for headaches.     Objective     /89 (BP Location: Right arm)   Temp 36.7 °C  (98.1 °F) (Temporal)   Wt (!) 152 kg (335 lb)   BMI 48.07 kg/m²     Physical Exam  Constitutional:       Comments: Well appearing on video visit. Speaks in full sentences. No slurred speech.       Assessment/Plan   Problem List Items Addressed This Visit    None  Visit Diagnoses         Codes    Episodic tension-type headache, not intractable    -  Primary G44.219    Acute gout of right foot, unspecified cause     M10.9    Relevant Orders    Uric acid    Referral to Rheumatology        Stress induced tension headaches: likely due to work schedule. Recommend headache journal, increase fluids, samples of nurtec for abortive therapy. Recommend against triptans due to multiple chronic diseases. Follow up wit neurology in 1 month.  Gout flare: recommend doubling dose of allopurinol for flare. Obtain uric acid, if > 6, he would be good candidate for Krystexxa infusion. Referral to rheum sent. He is aware of foods that can worsen gout and is actively trying to avoid these. He is on hydrochlorothiazide that can worsen gout flares, consider switching to CCB or MRA.     Discussed with Attending,    Dilia Fox, DO PGY-3

## 2024-06-12 ENCOUNTER — LAB (OUTPATIENT)
Dept: LAB | Facility: LAB | Age: 34
End: 2024-06-12
Payer: COMMERCIAL

## 2024-06-12 ENCOUNTER — TELEPHONE (OUTPATIENT)
Dept: PRIMARY CARE | Facility: CLINIC | Age: 34
End: 2024-06-12

## 2024-06-12 DIAGNOSIS — E11.9 TYPE 2 DIABETES MELLITUS WITHOUT COMPLICATION, WITHOUT LONG-TERM CURRENT USE OF INSULIN (MULTI): ICD-10-CM

## 2024-06-12 DIAGNOSIS — R79.89 ELEVATED TSH: ICD-10-CM

## 2024-06-12 DIAGNOSIS — M10.9 ACUTE GOUT OF RIGHT FOOT, UNSPECIFIED CAUSE: ICD-10-CM

## 2024-06-12 DIAGNOSIS — E53.8 VITAMIN B12 DEFICIENCY: Primary | ICD-10-CM

## 2024-06-12 LAB
TSH SERPL-ACNC: 1.7 MIU/L (ref 0.44–3.98)
URATE SERPL-MCNC: 5.9 MG/DL (ref 4–7.5)
VIT B12 SERPL-MCNC: 199 PG/ML (ref 211–911)

## 2024-06-12 PROCEDURE — 84550 ASSAY OF BLOOD/URIC ACID: CPT

## 2024-06-12 PROCEDURE — 82607 VITAMIN B-12: CPT

## 2024-06-12 PROCEDURE — 36415 COLL VENOUS BLD VENIPUNCTURE: CPT

## 2024-06-12 PROCEDURE — 84443 ASSAY THYROID STIM HORMONE: CPT

## 2024-06-12 RX ORDER — LANOLIN ALCOHOL/MO/W.PET/CERES
1000 CREAM (GRAM) TOPICAL DAILY
Qty: 30 TABLET | Refills: 11 | Status: SHIPPED | OUTPATIENT
Start: 2024-06-12

## 2024-06-12 NOTE — TELEPHONE ENCOUNTER
[place]  He came in for bloodwork today. He mentioned he needed samples of migraine med.   I looked at your note and it mentioned Nurtec samples.   I gave him 4 boxes of Nurtec.

## 2024-08-12 ENCOUNTER — APPOINTMENT (OUTPATIENT)
Dept: PRIMARY CARE | Facility: CLINIC | Age: 34
End: 2024-08-12
Payer: COMMERCIAL

## 2024-08-16 ENCOUNTER — TELEPHONE (OUTPATIENT)
Dept: PRIMARY CARE | Facility: CLINIC | Age: 34
End: 2024-08-16
Payer: COMMERCIAL

## 2024-08-16 DIAGNOSIS — E11.9 TYPE 2 DIABETES MELLITUS WITHOUT COMPLICATION, WITHOUT LONG-TERM CURRENT USE OF INSULIN (MULTI): ICD-10-CM

## 2024-08-16 NOTE — TELEPHONE ENCOUNTER
Rx Refill Request Telephone Encounter    Name:  Fadi Najjar  :  [id number]  Medication Name:    empagliflozin (Jardiance) 10 mg        Sig: Take 1 tablet (10 mg) by mouth once daily.        Sent to pharmacy as: empagliflozin 10 mg tablet (Jardiance)          Specific Pharmacy location:  [facility]/pharmacy #[id number] [address] AT [address], [facility] [address]  Phone: [phone]  Fax: [phone]

## 2024-08-27 ENCOUNTER — APPOINTMENT (OUTPATIENT)
Dept: PRIMARY CARE | Facility: CLINIC | Age: 34
End: 2024-08-27
Payer: COMMERCIAL

## 2024-08-27 VITALS
DIASTOLIC BLOOD PRESSURE: 65 MMHG | HEART RATE: 93 BPM | RESPIRATION RATE: 16 BRPM | BODY MASS INDEX: 49.84 KG/M2 | TEMPERATURE: 98.1 F | SYSTOLIC BLOOD PRESSURE: 111 MMHG | WEIGHT: 315 LBS | OXYGEN SATURATION: 93 %

## 2024-08-27 DIAGNOSIS — E11.9 TYPE 2 DIABETES MELLITUS WITHOUT COMPLICATION, WITHOUT LONG-TERM CURRENT USE OF INSULIN (MULTI): Primary | ICD-10-CM

## 2024-08-27 LAB — POC HEMOGLOBIN A1C: 5.8 % (ref 4.2–6.5)

## 2024-08-27 PROCEDURE — 83036 HEMOGLOBIN GLYCOSYLATED A1C: CPT

## 2024-08-27 PROCEDURE — 3044F HG A1C LEVEL LT 7.0%: CPT

## 2024-08-27 PROCEDURE — 3074F SYST BP LT 130 MM HG: CPT

## 2024-08-27 PROCEDURE — 3048F LDL-C <100 MG/DL: CPT

## 2024-08-27 PROCEDURE — 4010F ACE/ARB THERAPY RXD/TAKEN: CPT

## 2024-08-27 PROCEDURE — 99214 OFFICE O/P EST MOD 30 MIN: CPT

## 2024-08-27 PROCEDURE — 3061F NEG MICROALBUMINURIA REV: CPT

## 2024-08-27 PROCEDURE — 3078F DIAST BP <80 MM HG: CPT

## 2024-08-27 RX ORDER — DULAGLUTIDE 1.5 MG/.5ML
1.5 INJECTION, SOLUTION SUBCUTANEOUS WEEKLY
Qty: 12 EACH | Refills: 3 | Status: SHIPPED | OUTPATIENT
Start: 2024-08-27 | End: 2024-08-27 | Stop reason: WASHOUT

## 2024-08-27 ASSESSMENT — ENCOUNTER SYMPTOMS
COUGH: 0
CHILLS: 0
ABDOMINAL PAIN: 0
PALPITATIONS: 0
POLYPHAGIA: 0
FREQUENCY: 0
LIGHT-HEADEDNESS: 0
HEADACHES: 0
POLYDIPSIA: 0
WEAKNESS: 0
SHORTNESS OF BREATH: 0
FEVER: 0
SORE THROAT: 0
DIZZINESS: 0
DIFFICULTY URINATING: 0

## 2024-08-27 NOTE — ASSESSMENT & PLAN NOTE
-Currently taking metformin 1000 twice daily, Trulicity 0.75 mg per weekly injection, and Jardiance 10 mg  -A1c in office today is 5.8  -Given much improvement with A1c, will discontinue use of metformin  -Will continue Jardiance and switch Trulicity to Ozempic for better satiety effects and to continue helping his A1c improved  -Follow-up in 3 months to reassess A1c and reassess diabetes  Orders:    POCT glycosylated hemoglobin (Hb A1C) manually resulted

## 2024-08-27 NOTE — PROGRESS NOTES
Subjective   Fadi Najjar is a 34 y.o. male who presents for Diabetes (Pt here today for 3 month F/U).    HPI    This is a 34yoM with T2DM presenting for his 3 month follow up. He is on Jardiance, Trulicity, and metformin for his diabetes.  He has tried Victoza in the past without much results.  He states that overall he has been trying to eat better, sleep more, and has had less caffeine/sugar in his diet and feels this is helping with his headaches as well as his diabetes.  He has not noticed any of the side effects of his medications.  However he does feel like the Trulicity dose of 0.75, has not been as effective with his appetite suppression, and would like to consider a larger dose.    Review of Systems   Constitutional:  Negative for chills and fever.   HENT:  Negative for congestion and sore throat.    Respiratory:  Negative for cough and shortness of breath.    Cardiovascular:  Negative for chest pain and palpitations.   Gastrointestinal:  Negative for abdominal pain.   Endocrine: Negative for polydipsia, polyphagia and polyuria.   Genitourinary:  Negative for difficulty urinating and frequency.   Neurological:  Negative for dizziness, weakness, light-headedness and headaches.       Objective   /65 (BP Location: Right arm, Patient Position: Sitting)   Pulse 93   Temp 36.7 °C (98.1 °F) (Temporal)   Resp 16   Wt (!) 158 kg (347 lb 6 oz)   SpO2 93%   BMI 49.84 kg/m²     Physical Exam  Vitals reviewed.   Constitutional:       General: He is not in acute distress.     Appearance: Normal appearance. He is not ill-appearing.   HENT:      Head: Normocephalic and atraumatic.      Right Ear: External ear normal.      Left Ear: External ear normal.   Eyes:      Extraocular Movements: Extraocular movements intact.      Conjunctiva/sclera: Conjunctivae normal.   Cardiovascular:      Rate and Rhythm: Normal rate.   Pulmonary:      Effort: Pulmonary effort is normal. No respiratory distress.   Musculoskeletal:       Cervical back: Normal range of motion.   Skin:     General: Skin is warm and dry.      Capillary Refill: Capillary refill takes less than 2 seconds.   Neurological:      Mental Status: He is alert and oriented to person, place, and time.      Gait: Gait normal.   Psychiatric:         Mood and Affect: Mood normal.         Behavior: Behavior normal.       Recent Results (from the past 3 hour(s))   POCT glycosylated hemoglobin (Hb A1C) manually resulted    Collection Time: 08/27/24  4:39 PM   Result Value Ref Range    POC HEMOGLOBIN A1c 5.8 4.2 - 6.5 %               Assessment & Plan  Type 2 diabetes mellitus without complication, without long-term current use of insulin (Multi)  -Currently taking metformin 1000 twice daily, Trulicity 0.75 mg per weekly injection, and Jardiance 10 mg  -A1c in office today is 5.8  -Given much improvement with A1c, will discontinue use of metformin  -Will continue Jardiance and switch Trulicity to Ozempic for better satiety effects and to continue helping his A1c improved  -Follow-up in 3 months to reassess A1c and reassess diabetes  Orders:    POCT glycosylated hemoglobin (Hb A1C) manually resulted       Swati Echavarria DO  Family Medicine Resident, PGY-2  [facility]  [address] [place] [address] [phone]    This note may have been transcribed using Dragon voice recognition system and there is a possibility of unintentional typing misprints.  Any information found to be copied from previous providers is done in the best interest of the patient to provide accurate, quality, and continuity of care.

## 2024-09-04 DIAGNOSIS — E11.9 TYPE 2 DIABETES MELLITUS WITHOUT COMPLICATION, WITHOUT LONG-TERM CURRENT USE OF INSULIN (MULTI): Primary | ICD-10-CM

## 2024-09-04 RX ORDER — DULAGLUTIDE 0.75 MG/.5ML
0.75 INJECTION, SOLUTION SUBCUTANEOUS WEEKLY
Qty: 12 EACH | Refills: 3 | Status: SHIPPED | OUTPATIENT
Start: 2024-09-04 | End: 2025-09-04

## 2024-10-03 DIAGNOSIS — E11.9 TYPE 2 DIABETES MELLITUS WITHOUT COMPLICATION, WITHOUT LONG-TERM CURRENT USE OF INSULIN (MULTI): ICD-10-CM

## 2024-10-03 RX ORDER — EMPAGLIFLOZIN 10 MG/1
10 TABLET, FILM COATED ORAL DAILY
Qty: 90 TABLET | Refills: 3 | Status: SHIPPED | OUTPATIENT
Start: 2024-10-03

## 2024-10-21 DIAGNOSIS — J30.2 SEASONAL ALLERGIES: Primary | ICD-10-CM

## 2024-12-09 ENCOUNTER — PATIENT MESSAGE (OUTPATIENT)
Dept: PRIMARY CARE | Facility: CLINIC | Age: 34
End: 2024-12-09
Payer: COMMERCIAL

## 2024-12-09 DIAGNOSIS — E11.9 TYPE 2 DIABETES MELLITUS WITHOUT COMPLICATION, WITHOUT LONG-TERM CURRENT USE OF INSULIN (MULTI): Primary | ICD-10-CM

## 2024-12-09 RX ORDER — DULAGLUTIDE 1.5 MG/.5ML
1.5 INJECTION, SOLUTION SUBCUTANEOUS WEEKLY
Qty: 12 EACH | Refills: 0 | Status: SHIPPED | OUTPATIENT
Start: 2024-12-09 | End: 2025-03-09

## 2025-01-14 DIAGNOSIS — U07.1 COVID: Primary | ICD-10-CM

## 2025-01-14 RX ORDER — COVID-19 ANTIGEN TEST
1 KIT MISCELLANEOUS DAILY
Qty: 4 EACH | Refills: 0 | Status: SHIPPED | OUTPATIENT
Start: 2025-01-14

## 2025-01-15 ENCOUNTER — TELEPHONE (OUTPATIENT)
Dept: PRIMARY CARE | Facility: CLINIC | Age: 35
End: 2025-01-15
Payer: COMMERCIAL

## 2025-01-15 NOTE — TELEPHONE ENCOUNTER
Pt's wife called stated that Pt tested positive for covid and they are requesting a test to be sent to [facility]. Thanks

## 2025-02-04 DIAGNOSIS — J06.9 UPPER RESPIRATORY TRACT INFECTION, UNSPECIFIED TYPE: ICD-10-CM

## 2025-02-04 RX ORDER — FLUTICASONE PROPIONATE 50 MCG
1 SPRAY, SUSPENSION (ML) NASAL DAILY
Qty: 48 ML | Refills: 1 | Status: SHIPPED | OUTPATIENT
Start: 2025-02-04

## 2025-02-04 RX ORDER — FLUTICASONE PROPIONATE 50 MCG
SPRAY, SUSPENSION (ML) NASAL
Qty: 48 ML | Refills: 1 | Status: SHIPPED | OUTPATIENT
Start: 2025-02-04 | End: 2025-02-04

## 2025-02-13 DIAGNOSIS — M10.9 ACUTE GOUT OF RIGHT FOOT, UNSPECIFIED CAUSE: ICD-10-CM

## 2025-02-14 RX ORDER — COLCHICINE 0.6 MG/1
0.6 TABLET ORAL 2 TIMES DAILY
Qty: 60 TABLET | Refills: 2 | Status: SHIPPED | OUTPATIENT
Start: 2025-02-14 | End: 2025-05-15

## 2025-02-27 ENCOUNTER — TELEPHONE (OUTPATIENT)
Dept: PRIMARY CARE | Facility: CLINIC | Age: 35
End: 2025-02-27
Payer: COMMERCIAL

## 2025-02-27 DIAGNOSIS — Z71.6 ENCOUNTER FOR SMOKING CESSATION COUNSELING: ICD-10-CM

## 2025-02-27 DIAGNOSIS — Z71.89 HISTORY OF PARTICIPATION IN SMOKING CESSATION COUNSELING: Primary | ICD-10-CM

## 2025-02-27 RX ORDER — NICOTINE 21-14-7MG
1 KIT TRANSDERMAL DAILY
Qty: 1 KIT | Refills: 0 | Status: SHIPPED | OUTPATIENT
Start: 2025-02-27

## 2025-05-24 DIAGNOSIS — I10 BENIGN ESSENTIAL HYPERTENSION: ICD-10-CM

## 2025-05-27 RX ORDER — HYDROCHLOROTHIAZIDE 25 MG/1
25 TABLET ORAL DAILY
Qty: 90 TABLET | Refills: 3 | Status: SHIPPED | OUTPATIENT
Start: 2025-05-27

## 2025-06-16 DIAGNOSIS — I10 BENIGN ESSENTIAL HYPERTENSION: ICD-10-CM

## 2025-06-16 DIAGNOSIS — E11.9 TYPE 2 DIABETES MELLITUS WITHOUT COMPLICATION, WITHOUT LONG-TERM CURRENT USE OF INSULIN: ICD-10-CM

## 2025-06-16 RX ORDER — DULAGLUTIDE 1.5 MG/.5ML
1.5 INJECTION, SOLUTION SUBCUTANEOUS WEEKLY
Qty: 2 EACH | Refills: 2 | Status: SHIPPED | OUTPATIENT
Start: 2025-06-16 | End: 2025-07-28

## 2025-06-16 RX ORDER — LISINOPRIL 20 MG/1
20 TABLET ORAL DAILY
Qty: 90 TABLET | Refills: 3 | Status: SHIPPED | OUTPATIENT
Start: 2025-06-16

## 2025-06-27 DIAGNOSIS — E11.9 TYPE 2 DIABETES MELLITUS WITHOUT COMPLICATION, WITHOUT LONG-TERM CURRENT USE OF INSULIN: ICD-10-CM

## 2025-06-27 RX ORDER — SIMVASTATIN 20 MG/1
20 TABLET, FILM COATED ORAL NIGHTLY
Qty: 90 TABLET | Refills: 3 | Status: SHIPPED | OUTPATIENT
Start: 2025-06-27 | End: 2026-06-27

## 2025-08-20 ENCOUNTER — OFFICE VISIT (OUTPATIENT)
Dept: PRIMARY CARE | Facility: CLINIC | Age: 35
End: 2025-08-20
Payer: COMMERCIAL

## 2025-08-20 VITALS
OXYGEN SATURATION: 99 % | HEART RATE: 78 BPM | TEMPERATURE: 98.7 F | DIASTOLIC BLOOD PRESSURE: 70 MMHG | SYSTOLIC BLOOD PRESSURE: 124 MMHG | WEIGHT: 315 LBS | RESPIRATION RATE: 19 BRPM | BODY MASS INDEX: 51.43 KG/M2

## 2025-08-20 DIAGNOSIS — L25.9 CONTACT DERMATITIS, UNSPECIFIED CONTACT DERMATITIS TYPE, UNSPECIFIED TRIGGER: Primary | ICD-10-CM

## 2025-08-20 PROCEDURE — 3074F SYST BP LT 130 MM HG: CPT | Performed by: NURSE PRACTITIONER

## 2025-08-20 PROCEDURE — 3078F DIAST BP <80 MM HG: CPT | Performed by: NURSE PRACTITIONER

## 2025-08-20 PROCEDURE — 4010F ACE/ARB THERAPY RXD/TAKEN: CPT | Performed by: NURSE PRACTITIONER

## 2025-08-20 PROCEDURE — 99213 OFFICE O/P EST LOW 20 MIN: CPT | Performed by: NURSE PRACTITIONER

## 2025-08-20 RX ORDER — PREDNISONE 20 MG/1
TABLET ORAL
Qty: 18 TABLET | Refills: 0 | Status: SHIPPED | OUTPATIENT
Start: 2025-08-20

## 2025-08-20 ASSESSMENT — ENCOUNTER SYMPTOMS
MUSCULOSKELETAL NEGATIVE: 1
FATIGUE: 0
CHILLS: 0
RESPIRATORY NEGATIVE: 1
FEVER: 0
APPETITE CHANGE: 0